# Patient Record
Sex: MALE | Race: OTHER | HISPANIC OR LATINO | Employment: UNEMPLOYED | URBAN - METROPOLITAN AREA
[De-identification: names, ages, dates, MRNs, and addresses within clinical notes are randomized per-mention and may not be internally consistent; named-entity substitution may affect disease eponyms.]

---

## 2023-09-23 ENCOUNTER — HOSPITAL ENCOUNTER (EMERGENCY)
Facility: HOSPITAL | Age: 28
Discharge: HOME | End: 2023-09-23
Attending: EMERGENCY MEDICINE

## 2023-09-23 VITALS
DIASTOLIC BLOOD PRESSURE: 81 MMHG | TEMPERATURE: 99 F | RESPIRATION RATE: 20 BRPM | SYSTOLIC BLOOD PRESSURE: 134 MMHG | OXYGEN SATURATION: 99 % | WEIGHT: 176.56 LBS | HEART RATE: 82 BPM

## 2023-09-23 DIAGNOSIS — F11.93 OPIATE WITHDRAWAL: Primary | ICD-10-CM

## 2023-09-23 PROCEDURE — 99283 EMERGENCY DEPT VISIT LOW MDM: CPT

## 2023-09-23 PROCEDURE — 93010 EKG 12-LEAD: ICD-10-PCS | Mod: ,,, | Performed by: INTERNAL MEDICINE

## 2023-09-23 PROCEDURE — 93010 ELECTROCARDIOGRAM REPORT: CPT | Mod: ,,, | Performed by: INTERNAL MEDICINE

## 2023-09-23 PROCEDURE — 63600175 PHARM REV CODE 636 W HCPCS: Performed by: EMERGENCY MEDICINE

## 2023-09-23 PROCEDURE — 93005 ELECTROCARDIOGRAM TRACING: CPT

## 2023-09-23 RX ORDER — BUPRENORPHINE AND NALOXONE 2; .5 MG/1; MG/1
2 FILM, SOLUBLE BUCCAL; SUBLINGUAL DAILY
Status: DISCONTINUED | OUTPATIENT
Start: 2023-09-23 | End: 2023-09-24 | Stop reason: HOSPADM

## 2023-09-23 RX ORDER — BUPRENORPHINE AND NALOXONE 4; 1 MG/1; MG/1
1 FILM, SOLUBLE BUCCAL; SUBLINGUAL 2 TIMES DAILY
Qty: 14 FILM | Refills: 0 | Status: SHIPPED | OUTPATIENT
Start: 2023-09-23 | End: 2023-09-30

## 2023-09-23 RX ORDER — ONDANSETRON 2 MG/ML
4 INJECTION INTRAMUSCULAR; INTRAVENOUS
Status: DISCONTINUED | OUTPATIENT
Start: 2023-09-23 | End: 2023-09-23

## 2023-09-23 RX ADMIN — BUPRENORPHINE AND NALOXONE 2 FILM: 2; .5 FILM BUCCAL; SUBLINGUAL at 09:09

## 2023-09-24 NOTE — DISCHARGE INSTRUCTIONS
Take the prescribed medicine once in the morning and once in the evening. You need to follow-up at a clinic on the resource page you were given to have this re-filled.     Thank you for choosing [facility]! We appreciate you trusting us with your medical care.     It is important to remember that some problems are difficult to diagnose and may not be found during your first visit. Be sure to follow up with your primary care doctor and review any labs/imaging that was performed during your visit with them. If you do not have a primary care doctor, you may contact the one listed on your discharge paperwork, or you may also call the [facility] Appointment Desk at [phone] to schedule an appointment.     All medications may potentially have side effects and it is impossible to predict which medications may give you side effects. If you feel that you are having a negative effect of any medication you should immediately stop taking them and seek medical attention.  Do not drive or make any important decisions for 24 hours if you have received any pain medications, sedatives or mood altering drugs during your ER visit.    We will be happy to take care of you for all of your future medical needs. You may return to the ER at any time for any new/concerning symptoms, worsening condition, or failure to improve. We hope you feel better soon.     Amina Schwab MD  Board Certified Emergency Medicine Physician

## 2023-09-24 NOTE — ED NOTES
Pt appears to be resting comfortable in stretcher. Pt in NAD/VSS. Pt reports mild body aches but denies restlessness and palpitations at this time.

## 2023-09-24 NOTE — ED NOTES
Pt appears to be less restless at this time. Pt reports mild relief of symptoms s/t medication administration. MD made aware.

## 2023-09-24 NOTE — ED NOTES
Pt resting comfortably in stretcher in klow and loc ked position, side rails raised x2, call light in reach. Pt remains on continuous pulse oximetry, and BP cycling every 60 minutes. Pt in NAD. Pt reports a significant decrease in severity of symptoms.

## 2023-09-24 NOTE — FIRST PROVIDER EVALUATION
Emergency Department TeleTriage Encounter Note      CHIEF COMPLAINT    Chief Complaint   Patient presents with    Withdrawal     States he has been taking 10 percocet's a day for a year. Stopped 3 days ago. Reports body aches, chest palpitations, and being restless. Denies N/V. Patient reports no medical problems. Denies daily medications. Does drink alcohol on weekends. One beer today.        VITAL SIGNS   Initial Vitals [09/23/23 1931]   BP Pulse Resp Temp SpO2   (!) 129/99 110 19 98.6 °F (37 °C) 100 %      MAP       --            ALLERGIES    Review of patient's allergies indicates:  No Known Allergies    PROVIDER TRIAGE NOTE  This is a teletriage evaluation of a 27 y.o. male presenting to the ED complaining of withdrawal. Patient last took percocet 3 days ago. He has had nausea and vomiting and body aches. He denies other drug use or alcohol abuse.    Patient is alert and oriented. He speaks in complete sentences. He is sitting upright in the chair in no distress.     Initial orders will be placed and care will be transferred to an alternate provider when patient is roomed for a full evaluation. Any additional orders and the final disposition will be determined by that provider.         ORDERS  Labs Reviewed   CBC W/ AUTO DIFFERENTIAL   COMPREHENSIVE METABOLIC PANEL       ED Orders (720h ago, onward)      Start Ordered     Status Ordering Provider    09/23/23 2000 09/23/23 1952  sodium chloride 0.9% bolus 1,000 mL 1,000 mL  ED 1 Time         Ordered ABRAHAM BUSTILLO    09/23/23 2000 09/23/23 1952  ondansetron injection 4 mg  ED 1 Time         Ordered ABRAHAM BUSTILLO    09/23/23 1953 09/23/23 1952  CBC auto differential  STAT         Ordered ABRAHAM BUSTILLO G.    09/23/23 1953 09/23/23 1952  Comprehensive metabolic panel  STAT         Ordered ABRAHAM BUSTILLO    09/23/23 1953 09/23/23 1952  Insert Saline lock IV  Once         Ordered ABRAHAM BUSTILLO    09/23/23 1936 09/23/23 1936  EKG 12-lead  Once         Completed  by SAI LUNA on 9/23/2023 at  7:38 PM JULIO RAO JR              Virtual Visit Note: The provider triage portion of this emergency department evaluation and documentation was performed via [employer], a HIPAA-compliant telemedicine application, in concert with a tele-presenter in the room. A face to face patient evaluation with one of my colleagues will occur once the patient is placed in an emergency department room.      DISCLAIMER: This note was prepared with [employer] voice recognition transcription software. Garbled syntax, mangled pronouns, and other bizarre constructions may be attributed to that software system.

## 2023-09-24 NOTE — ED NOTES
Pt reports to ED with c/o body aches, palpitations, and restlessness. Pt reports he has been taking 10 Percocet a day for a year up until 3 days ago when he quit. Pt denies drug use in the past 3 days. Pt also reports drinking a beer today. Pt denies N/V, hallucinations, delusions, HA.     Adult Physical Assessment  LOC: Lowell Matias, 27 y.o. male verified via two identifiers.  The patient is awake, alert, oriented and speaking appropriately at this time.  APPEARANCE: Patient restless. Patient is clean and well groomed, patient's clothing is properly fastened.  SKIN:The skin is warm and dry, color consistent with ethnicity, patient has normal skin turgor and moist mucus membranes, skin intact, no breakdown or brusing noted.  MUSCULOSKELETAL: Patient moving all extremities well, no obvious swelling or deformities noted. Pt c/o body aches.   RESPIRATORY: Airway is open and patent, respirations are spontaneous, patient has a normal effort and rate, no accessory muscle use noted.  CARDIAC: Patient has a normal rate and rhythm, no periphreal edema noted in any extremity, capillary refill < 3 seconds in all extremities. Pt reports intermittent palpitations.   ABDOMEN: Soft and non tender to palpation, no abdominal distention noted. Bowel sounds present in all four quadrants.  NEUROLOGIC: Eyes open spontaneously, behavior appropriate to situation, follows commands, facial expression symmetrical, bilateral hand grasp equal and even, purposeful motor response noted, normal sensation in all extremities when touched with a finger.

## 2023-09-24 NOTE — ED PROVIDER NOTES
Encounter Date: 9/23/2023       History     Chief Complaint   Patient presents with    Withdrawal     States he has been taking 10 percocet's a day for a year. Stopped 3 days ago. Reports body aches, chest palpitations, and being restless. Denies N/V. Patient reports no medical problems. Denies daily medications. Does drink alcohol on weekends. One beer today.      27-year-old male with past medical history as below presents with complaint of withdrawal.  Patient says that he has been taking 10 Percocets daily for the past year, but stopped 3 days ago after he came here from [place].  He says that he is interested in detox.  Admits to occasional crystal meth use.  Says that yesterday he had nausea and vomiting, says today he no longer has a symptoms.  Says that he currently feels restless and anxious. Denies ever injecting drugs. Says he is planning to move here.     The history is provided by the patient.  used: offered , pt declined and speaks English.     Review of patient's allergies indicates:  No Known Allergies  No past medical history on file.  No past surgical history on file.  No family history on file.     Review of Systems    Physical Exam     Initial Vitals [09/23/23 1931]   BP Pulse Resp Temp SpO2   (!) 129/99 110 19 98.6 °F (37 °C) 100 %      MAP       --         Physical Exam    Constitutional: He appears well-developed and well-nourished. He is not diaphoretic. No distress.   HENT:   Head: Normocephalic and atraumatic.   Eyes: EOM are normal.   Neck:   Normal range of motion.  Cardiovascular:            Tachycardic, regular rhythm   Pulmonary/Chest: No respiratory distress.   Abdominal: Abdomen is soft. He exhibits no distension. There is no abdominal tenderness.   Musculoskeletal:         General: Normal range of motion.      Cervical back: Normal range of motion.     Neurological: He is alert and oriented to person, place, and time.   Skin: Skin is warm and  dry.   Psychiatric: He has a normal mood and affect.         ED Course   Procedures  Labs Reviewed - No data to display       Imaging Results    None          Medications   buprenorphine-naloxone 2-0.5 mg SL film 2 Film (2 Film Sublingual Given 9/23/23 2116)     Medical Decision Making  27-year-old male with past medical history opioid use disorder presents with complaint of withdrawal after stopping ingestion of Percocet 3 days ago.  Ddx includes but not limited to opioid withdrawal, gastroenteritis, dehydration, electrolyte abnormality. Upon arrival he was tachycardic, otherwise normal vital signs.  No abdominal pain, nausea, vomiting or diarrhea today, therefore considered but did not proceed with labs. Patient was given 4mg dose Suboxone with resolution of his symptoms.  He was observed after administration and continued to rest comfortably with no reoccurrence of symptoms or indication to repeat the dose.  He was given a 1 week supply.  Was also given a printout of resources of clinics that he can call to schedule follow up for further management of his opiate use disorder.  Given strict return precautions and outpatient follow up.    No acute emergent medical condition has been identified. The patient appears to be low risk for an emergent medical condition is appropriate for discharge with outpatient f/u as detailed in discharge instructions for reevaluation and possible continued outpatient workup and management. I have discussed the workup with the patient, who has verbalized understanding of the plan and need for outpatient follow-up.  This evaluation does not preclude the development of an emergent condition so in addition, I have advised the patient that they can return to the ED at any time with worsening or change of their symptoms, or with any other medical complaint.       Amount and/or Complexity of Data Reviewed  Independent Historian: friend     Details: At bedside throughout ED stay  External  Data Reviewed: notes.     Details: Seen at [facility] yesterday for similar symptoms     Risk  OTC drugs.  Prescription drug management.  Diagnosis or treatment significantly limited by social determinants of health.               ED Course as of 09/24/23 0102   Sat Sep 23, 2023   2130 Sxs improved after suboxone [AT]      ED Course User Index  [AT] Amina Schwab MD                    Clinical Impression:   Final diagnoses:  [F11.93] Opiate withdrawal (Primary)        ED Disposition Condition    Discharge Stable          ED Prescriptions       Medication Sig Dispense Start Date End Date Auth. Provider    buprenorphine-naloxone 4-1 mg (SUBOXONE) 4-1 mg Film Place 1 Film under the tongue 2 (two) times a day. for 7 days 14 Film 9/23/2023 9/30/2023 Amina Schwab MD          Follow-up Information       Follow up With Specialties Details Why Contact Info Additional Information    [facility] Emergency Dept Emergency Medicine  As needed, If symptoms worsen [address] [address] [phone]     [facility] Family Medicine Schedule an appointment as soon as possible for a visit in 2 days  [address], [address]  [facility] [place] [address] [phone] Please park in [address] or D and use [address] case. Take [address] elevators.                    Amina Schwab MD  09/24/23 0104